# Patient Record
Sex: FEMALE | Race: BLACK OR AFRICAN AMERICAN | Employment: FULL TIME | URBAN - METROPOLITAN AREA
[De-identification: names, ages, dates, MRNs, and addresses within clinical notes are randomized per-mention and may not be internally consistent; named-entity substitution may affect disease eponyms.]

---

## 2017-04-10 ENCOUNTER — TRANSFERRED RECORDS (OUTPATIENT)
Dept: HEALTH INFORMATION MANAGEMENT | Facility: CLINIC | Age: 27
End: 2017-04-10

## 2019-07-31 ENCOUNTER — TRANSFERRED RECORDS (OUTPATIENT)
Dept: HEALTH INFORMATION MANAGEMENT | Facility: CLINIC | Age: 29
End: 2019-07-31

## 2019-10-19 ENCOUNTER — TRANSFERRED RECORDS (OUTPATIENT)
Dept: HEALTH INFORMATION MANAGEMENT | Facility: CLINIC | Age: 29
End: 2019-10-19

## 2020-02-09 ENCOUNTER — HOSPITAL ENCOUNTER (EMERGENCY)
Facility: CLINIC | Age: 30
Discharge: HOME | End: 2020-02-09
Attending: EMERGENCY MEDICINE | Admitting: EMERGENCY MEDICINE

## 2020-02-09 VITALS
HEART RATE: 59 BPM | RESPIRATION RATE: 20 BRPM | OXYGEN SATURATION: 100 % | TEMPERATURE: 98.1 F | SYSTOLIC BLOOD PRESSURE: 130 MMHG | DIASTOLIC BLOOD PRESSURE: 79 MMHG

## 2020-02-09 DIAGNOSIS — F41.0 PANIC ATTACK: ICD-10-CM

## 2020-02-09 PROCEDURE — 93010 ELECTROCARDIOGRAM REPORT: CPT | Mod: Z6 | Performed by: EMERGENCY MEDICINE

## 2020-02-09 PROCEDURE — 93005 ELECTROCARDIOGRAM TRACING: CPT | Performed by: EMERGENCY MEDICINE

## 2020-02-09 PROCEDURE — 99283 EMERGENCY DEPT VISIT LOW MDM: CPT | Performed by: EMERGENCY MEDICINE

## 2020-02-09 PROCEDURE — 99284 EMERGENCY DEPT VISIT MOD MDM: CPT | Mod: 25 | Performed by: EMERGENCY MEDICINE

## 2020-02-09 RX ORDER — LORAZEPAM 1 MG/1
1 TABLET ORAL EVERY 6 HOURS PRN
COMMUNITY
End: 2021-02-11

## 2020-02-09 RX ORDER — OMEPRAZOLE 40 MG/1
40 CAPSULE, DELAYED RELEASE ORAL DAILY
COMMUNITY
End: 2020-06-30

## 2020-02-09 RX ORDER — CITALOPRAM HYDROBROMIDE 20 MG/1
20 TABLET ORAL DAILY
COMMUNITY
End: 2020-06-30

## 2020-02-09 SDOH — HEALTH STABILITY: MENTAL HEALTH: HOW OFTEN DO YOU HAVE A DRINK CONTAINING ALCOHOL?: NEVER

## 2020-02-09 NOTE — ED AVS SNAPSHOT
[facility] Emergency Department  [address]   [place] [address]  Phone:  [phone]  Fax:  [phone]                                    Debo Do   MRN: [id number]    Department:  [facility] Emergency Department   Date of Visit:  2/9/2020           After Visit Summary Signature Page    I have received my discharge instructions, and my questions have been answered. I have discussed any challenges I see with this plan with the nurse or doctor.    ..........................................................................................................................................  Patient/Patient Representative Signature      ..........................................................................................................................................  Patient Representative Print Name and Relationship to Patient    ..................................................               ................................................  Date                                   Time    ..........................................................................................................................................  Reviewed by Signature/Title    ...................................................              ..............................................  Date                                               Time          22EPIC Rev 08/18

## 2020-02-10 NOTE — ED TRIAGE NOTES
Pt reports this evening she was watching TV and felt an area on her back that was hot, after a short period she started feeling hot all over and developed tingling in her right arm and hand. When she tried to go to sleep she developed tightness in her chest. Pt reports a history of panic attaches and states her symptoms this evening are similar to what she has during an attach. Pt states she has been trying to cut back on her lorazepam and hadn't taking for about a week, states she took one on the way here but has not had any relief.

## 2020-02-10 NOTE — ED PROVIDER NOTES
History     Chief Complaint   Patient presents with     Chest Pain     HPI  Debo Do is a 29 year old female who presents with possible panic attack.  Patient states she is had a long history of the same.  For the last few months she has been taking lorazepam in the evenings so she prevents panic attacks and is able to sleep.  Been trying to wean herself off that and abruptly stopped that last Sunday.  She has been treated for this with Celexa.  She was on 10 mg dose without much improvement.  Few months back the increased her dose to 20 mg a day and since that time she feels it is been more beneficial.  She denies any significant depression or suicidal ideation currently.  Does have a history of acid reflux and takes Prilosec for this.  Former smoker.  No history of thyroid issues.  She was watching TV and developed a burning sensation in her back and that moved to her chest.  She then developed some numbness and tingling in her extremities.  She felt tight in her chest so she tried to go to sleep to see if that would break the cycle.  She states this is very similar to her previous panic attacks.  Unfortunately she was unable to break the cycle so she took 1 mg of Ativan and presents here for evaluation.  By the time I speak with the patient she states the Ativan is finally kicking in and she feels almost back to baseline.  She denies any recent illness.  No cough or cold symptoms.  Currently has no chest tightness or pain.  Does not feel short of breath.  The numbness in her hand has resolved.  Patient recently moved from [place].  She has a primary care provider but is not establish care with a therapist yet.    Allergies:  No Known Allergies    Problem List:    There are no active problems to display for this patient.       Past Medical History:    History reviewed. No pertinent past medical history.    Past Surgical History:    History reviewed. No pertinent surgical history.    Family History:     History reviewed. No pertinent family history.    Social History:  Marital Status:    Social History     Tobacco Use     Smoking status: Former Smoker     Smokeless tobacco: Never Used     Tobacco comment: quit in Oct   Substance Use Topics     Alcohol use: Not Currently     Frequency: Never     Drug use: Not Currently        Medications:    citalopram (CELEXA) 20 MG tablet  LORazepam (ATIVAN) 1 MG tablet  omeprazole (PRILOSEC) 40 MG DR capsule          Review of Systems all other systems are reviewed and are negative.    Physical Exam   BP: (!) 129/93  Heart Rate: 77  Temp: 98.1  F (36.7  C)  Resp: 20  SpO2: 99 %      Physical Exam general pleasant cooperative female who does not look toxic or ill.  She is vitally stable.  She is not hypoxic.  HEENT shows no proptosis.  Her speech is clear.  No pressured speech or flight of ideas.  Neck reveals no thyromegaly.  Lungs are clear without adventitious sounds.  Cardiac auscultation is normal without murmur.  Neurologic exam is nonfocal.    ED Course        Procedures               EKG Interpretation:      Interpreted by Delmer Vickers MD  Time reviewed: 22:05   Symptoms at time of EKG: chest tightness  Rhythm: normal sinus   Rate: Bradycardia  Axis: Normal  Ectopy: none  Conduction: normal  ST Segments/ T Waves: No acute ischemic changes  Q Waves: none  Comparison to prior: No old EKG available    Clinical Impression: sinus bradycardia                Critical Care time:  none               No results found for this or any previous visit (from the past 24 hour(s)).    Medications - No data to display  Just prior to discharge at 10:15 PM patient feels back to baseline and is ready to go home.  Assessments & Plan (with Medical Decision Making)   Debo Do is a 29 year old female who presents with possible panic attack.  Patient states she is had a long history of the same.  For the last few months she has been taking lorazepam in the evenings so she prevents panic  attacks and is able to sleep.  Been trying to wean herself off that and abruptly stopped that last Sunday.  She has been treated for this with Celexa.  She was on 10 mg dose without much improvement.  Few months back the increased her dose to 20 mg a day and since that time she feels it is been more beneficial.  She denies any significant depression or suicidal ideation currently.  Does have a history of acid reflux and takes Prilosec for this.  Former smoker.  No history of thyroid issues.  She was watching TV and developed a burning sensation in her back and that moved to her chest.  She then developed some numbness and tingling in her extremities.  She felt tight in her chest so she tried to go to sleep to see if that would break the cycle.  She states this is very similar to her previous panic attacks.  Unfortunately she was unable to break the cycle so she took 1 mg of Ativan and presents here for evaluation.  By the time I speak with the patient she states the Ativan is finally kicking in and she feels almost back to baseline.  She denies any recent illness.  No cough or cold symptoms.  Currently has no chest tightness or pain.  Does not feel short of breath.  The numbness in her hand has resolved.  On exam patient was afebrile and vitally stable.  She is not hypoxic.  Her exam is unremarkable as noted above.  EKG showed a sinus bradycardia at 56 with no acute ischemic changes.  No old EKG for comparison as she is just moved recently from [place].  Patient took her own Ativan and had relief of her symptoms.  Suspect panic attack.  She has a primary doctor but does not have a therapist in the area so we have made a referral and they will contact her within the next 24 hours.  Information on panic attack is provided and reasons to return for reassessment are discussed..  I have reviewed the nursing notes.    I have reviewed the findings, diagnosis, plan and need for follow up with the patient.       New  Prescriptions    No medications on file       Final diagnoses:   Panic attack       2/9/2020   [facility] EMERGENCY DEPARTMENT     Delmer Vickers MD  02/09/20 9401

## 2020-02-12 ENCOUNTER — TRANSFERRED RECORDS (OUTPATIENT)
Dept: HEALTH INFORMATION MANAGEMENT | Facility: CLINIC | Age: 30
End: 2020-02-12

## 2020-03-13 ENCOUNTER — DOCUMENTATION ONLY (OUTPATIENT)
Dept: INTERNAL MEDICINE | Facility: CLINIC | Age: 30
End: 2020-03-13

## 2020-03-13 ENCOUNTER — OFFICE VISIT (OUTPATIENT)
Dept: INTERNAL MEDICINE | Facility: CLINIC | Age: 30
End: 2020-03-13

## 2020-03-13 VITALS
DIASTOLIC BLOOD PRESSURE: 70 MMHG | HEIGHT: 62 IN | SYSTOLIC BLOOD PRESSURE: 116 MMHG | TEMPERATURE: 98.5 F | WEIGHT: 191.8 LBS | BODY MASS INDEX: 35.3 KG/M2 | RESPIRATION RATE: 20 BRPM | HEART RATE: 89 BPM | OXYGEN SATURATION: 100 %

## 2020-03-13 DIAGNOSIS — K21.9 GASTROESOPHAGEAL REFLUX DISEASE, ESOPHAGITIS PRESENCE NOT SPECIFIED: ICD-10-CM

## 2020-03-13 DIAGNOSIS — F41.1 GAD (GENERALIZED ANXIETY DISORDER): Primary | ICD-10-CM

## 2020-03-13 PROCEDURE — 99203 OFFICE O/P NEW LOW 30 MIN: CPT | Performed by: INTERNAL MEDICINE

## 2020-03-13 ASSESSMENT — MIFFLIN-ST. JEOR: SCORE: 1553.25

## 2020-03-13 ASSESSMENT — ENCOUNTER SYMPTOMS
ABDOMINAL PAIN: 0
VOMITING: 0
AGITATION: 0
NAUSEA: 0
NERVOUS/ANXIOUS: 0
HEARTBURN: 0

## 2020-03-13 ASSESSMENT — ANXIETY QUESTIONNAIRES
6. BECOMING EASILY ANNOYED OR IRRITABLE: SEVERAL DAYS
GAD7 TOTAL SCORE: 2
4. TROUBLE RELAXING: NOT AT ALL
2. NOT BEING ABLE TO STOP OR CONTROL WORRYING: NOT AT ALL
GAD7 TOTAL SCORE: 2
GAD7 TOTAL SCORE: 2
5. BEING SO RESTLESS THAT IT IS HARD TO SIT STILL: NOT AT ALL
3. WORRYING TOO MUCH ABOUT DIFFERENT THINGS: SEVERAL DAYS
1. FEELING NERVOUS, ANXIOUS, OR ON EDGE: NOT AT ALL
7. FEELING AFRAID AS IF SOMETHING AWFUL MIGHT HAPPEN: NOT AT ALL
7. FEELING AFRAID AS IF SOMETHING AWFUL MIGHT HAPPEN: NOT AT ALL

## 2020-03-13 ASSESSMENT — PATIENT HEALTH QUESTIONNAIRE - PHQ9
SUM OF ALL RESPONSES TO PHQ QUESTIONS 1-9: 1
10. IF YOU CHECKED OFF ANY PROBLEMS, HOW DIFFICULT HAVE THESE PROBLEMS MADE IT FOR YOU TO DO YOUR WORK, TAKE CARE OF THINGS AT HOME, OR GET ALONG WITH OTHER PEOPLE: NOT DIFFICULT AT ALL
SUM OF ALL RESPONSES TO PHQ QUESTIONS 1-9: 1

## 2020-03-13 NOTE — NURSING NOTE
"/70 (BP Location: Right arm, Patient Position: Sitting, Cuff Size: Adult Large)   Pulse 89   Temp 98.5  F (36.9  C) (Oral)   Resp 20   Ht 1.575 m (5' 2\")   Wt 87 kg (191 lb 12.8 oz)   LMP 03/09/2020 (Exact Date)   SpO2 100%   Breastfeeding No   BMI 35.08 kg/m    Sol Penn CMA    "

## 2020-03-13 NOTE — PROGRESS NOTES
Subjective     Debo Do is a 30 year old female who presents to clinic today for the following health issues:    HPI   Patient has general anxiety disorder and use citalopram and lorazepam.  Patient moved from [place] after finishing her school.  Patient use lorazepam once or 2 pills a month.  Patient has enough refills of her medication and started taking citalopram regularly.    Patient also has gastroesophageal reflux disease and takes omeprazole.  Had endoscopy in the past.  Denies reflux symptoms.      Patient Active Problem List   Diagnosis     Gastroesophageal reflux disease, esophagitis presence not specified     DIGNA (generalized anxiety disorder)     History reviewed. No pertinent surgical history.    Social History     Tobacco Use     Smoking status: Former Smoker     Smokeless tobacco: Never Used     Tobacco comment: quit in Oct   Substance Use Topics     Alcohol use: Not Currently     Frequency: Never     Family History   Problem Relation Age of Onset     Hypertension Mother      Hyperlipidemia Father      No Known Problems Maternal Grandmother      No Known Problems Maternal Grandfather      Dementia Paternal Grandmother         Possibly alzheimers     No Known Problems Paternal Grandfather          Current Outpatient Medications   Medication Sig Dispense Refill     citalopram (CELEXA) 20 MG tablet Take 20 mg by mouth daily       LORazepam (ATIVAN) 1 MG tablet Take 1 mg by mouth every 6 hours as needed for anxiety       omeprazole (PRILOSEC) 40 MG DR capsule Take 40 mg by mouth daily       vitamin C-electrolytes (EMERGEN-C) 1000mg vitamin C super orange drink mix Take 1 packet by mouth daily Mix 1 packet in 4-6oz water and take once or twice daily or as directed.       No Known Allergies      Reviewed and updated as needed this visit by Provider  Meds  Problems       Review of Systems   Gastrointestinal: Negative for abdominal pain, heartburn, nausea and vomiting.   Psychiatric/Behavioral:  "Negative for agitation. The patient is not nervous/anxious.           Objective    /70 (BP Location: Right arm, Patient Position: Sitting, Cuff Size: Adult Large)   Pulse 89   Temp 98.5  F (36.9  C) (Oral)   Resp 20   Ht 1.575 m (5' 2\")   Wt 87 kg (191 lb 12.8 oz)   LMP 03/09/2020 (Exact Date)   SpO2 100%   Breastfeeding No   BMI 35.08 kg/m    Body mass index is 35.08 kg/m .  Physical Exam  Vitals signs reviewed.   Constitutional:       Appearance: Normal appearance.   Neck:      Musculoskeletal: Neck supple.   Cardiovascular:      Rate and Rhythm: Normal rate and regular rhythm.      Pulses: Normal pulses.   Pulmonary:      Effort: Pulmonary effort is normal.      Breath sounds: Normal breath sounds. No wheezing.   Abdominal:      Palpations: Abdomen is soft. There is no mass.      Tenderness: There is no abdominal tenderness.   Lymphadenopathy:      Cervical: No cervical adenopathy.   Neurological:      General: No focal deficit present.      Mental Status: She is alert.   Psychiatric:         Mood and Affect: Mood normal.        Assessment & Plan     Debo was seen today for establish care.    Diagnoses and all orders for this visit:    DIGNA (generalized anxiety disorder)    Gastroesophageal reflux disease, esophagitis presence not specified      Advised the patient to follow-up when she get insurance through physical.    Answers for HPI/ROS submitted by the patient on 3/13/2020   If you checked off any problems, how difficult have these problems made it for you to do your work, take care of things at home, or get along with other people?: Not difficult at all  PHQ9 TOTAL SCORE: 1  DIGNA 7 TOTAL SCORE: 2    Lionel Fan MD  [facility]  "

## 2020-03-14 ASSESSMENT — ANXIETY QUESTIONNAIRES: GAD7 TOTAL SCORE: 2

## 2020-04-07 ENCOUNTER — TELEPHONE (OUTPATIENT)
Dept: INTERNAL MEDICINE | Facility: CLINIC | Age: 30
End: 2020-04-07

## 2020-04-07 NOTE — TELEPHONE ENCOUNTER
Patient called with questions regarding the 3 medications she takes. Declined needing refills, declined any further information. Call [phone].

## 2020-04-08 NOTE — TELEPHONE ENCOUNTER
Pt calls back. She wanted to know if Dr Fan would be able to refill her medications when the time came for them to refill.    Advised her that according to the OV notes, that the doctor should be able to refill the medications when due.     She states she still doesn't have insurance yet.     Advised to call when she has a few days left of her medication and we can discuss with Dr Fan about refilling. She agrees.

## 2020-05-12 ENCOUNTER — VIRTUAL VISIT (OUTPATIENT)
Dept: INTERNAL MEDICINE | Facility: CLINIC | Age: 30
End: 2020-05-12
Payer: COMMERCIAL

## 2020-05-12 DIAGNOSIS — F41.1 GAD (GENERALIZED ANXIETY DISORDER): ICD-10-CM

## 2020-05-12 DIAGNOSIS — Z13.220 ENCOUNTER FOR LIPID SCREENING FOR CARDIOVASCULAR DISEASE: ICD-10-CM

## 2020-05-12 DIAGNOSIS — Z31.9 DESIRE FOR PREGNANCY: Primary | ICD-10-CM

## 2020-05-12 DIAGNOSIS — Z13.6 ENCOUNTER FOR LIPID SCREENING FOR CARDIOVASCULAR DISEASE: ICD-10-CM

## 2020-05-12 PROCEDURE — 99214 OFFICE O/P EST MOD 30 MIN: CPT | Mod: 95 | Performed by: INTERNAL MEDICINE

## 2020-05-12 ASSESSMENT — ENCOUNTER SYMPTOMS
NERVOUS/ANXIOUS: 0
AGITATION: 0
DYSPHORIC MOOD: 0

## 2020-05-12 NOTE — PROGRESS NOTES
"Debo Do is a 30 year old female who is being evaluated via a billable video visit.      The patient has been notified of following:     \"This video visit will be conducted via a call between you and your physician/provider. We have found that certain health care needs can be provided without the need for an in-person physical exam.  This service lets us provide the care you need with a video conversation.  If a prescription is necessary we can send it directly to your pharmacy.  If lab work is needed we can place an order for that and you can then stop by our lab to have the test done at a later time.    Video visits are billed at different rates depending on your insurance coverage.  Please reach out to your insurance provider with any questions.    If during the course of the call the physician/provider feels a video visit is not appropriate, you will not be charged for this service.\"    Patient has given verbal consent for Video visit? Yes    How would you like to obtain your AVS? [place]    Patient would like the video invitation sent by: Text to cell phone: [phone]    Will anyone else be joining your video visit? No    Rama Lozano CMA.      Subjective     Debo Do is a 30 year old female who presents today via video visit for the following health issues:    [facility]     Video Start Time: 1:49 PM    Patient has anxiety and takes citalopram and lorazepam as needed.  Patient had one episode of panic attack which was not controlled by lorazepam and patient had to go to ER.  So patient is worried and takes lorazepam every day at bedtime.  Patient is also thinking about becoming pregnant and wanted to make sure if it is safe to continue her anxiety medications.  Patient does not smoke cigarettes or tobacco.  Patient smokes marijuana intermittently.  Does not drink alcohol regularly.  Currently not on birth control.  Patient is also having difficulty losing weight and has been exercising regularly " "with her boyfriend.  Patient wanted not the medications making her gain weight.    Patient Active Problem List   Diagnosis     Gastroesophageal reflux disease, esophagitis presence not specified     DIGNA (generalized anxiety disorder)     No past surgical history on file.    Social History     Tobacco Use     Smoking status: Former Smoker     Smokeless tobacco: Never Used     Tobacco comment: quit in Oct   Substance Use Topics     Alcohol use: Not Currently     Frequency: Never     Family History   Problem Relation Age of Onset     Hypertension Mother      Hyperlipidemia Father      No Known Problems Maternal Grandmother      No Known Problems Maternal Grandfather      Dementia Paternal Grandmother         Possibly alzheimers     No Known Problems Paternal Grandfather          Current Outpatient Medications   Medication Sig Dispense Refill     citalopram (CELEXA) 20 MG tablet Take 20 mg by mouth daily       LORazepam (ATIVAN) 1 MG tablet Take 1 mg by mouth every 6 hours as needed for anxiety       omeprazole (PRILOSEC) 40 MG DR capsule Take 40 mg by mouth daily       vitamin C-electrolytes (EMERGEN-C) 1000mg vitamin C super orange drink mix Take 1 packet by mouth daily Mix 1 packet in 4-6oz water and take once or twice daily or as directed.       No Known Allergies    Reviewed and updated as needed this visit by Provider      Review of Systems   Psychiatric/Behavioral: Negative for agitation, dysphoric mood and suicidal ideas. The patient is not nervous/anxious.             Objective    There were no vitals taken for this visit.  Estimated body mass index is 35.08 kg/m  as calculated from the following:    Height as of 3/13/20: 1.575 m (5' 2\").    Weight as of 3/13/20: 87 kg (191 lb 12.8 oz).  Physical Exam     \"GENERAL: Healthy, alert and no distress\",\"EYES: Eyes grossly normal to inspection.  No discharge or erythema, or obvious scleral/conjunctival abnormalities.\",\"RESP: No audible wheeze, cough, or visible " "cyanosis.  No visible retractions or increased work of breathing.  \",\"SKIN: Visible skin clear. No significant rash, abnormal pigmentation or lesions.\",\"NEURO: Cranial nerves grossly intact.  Mentation and speech appropriate for age.\",\"PSYCH: Mentation appears normal, affect normal/bright, judgement and insight intact, normal speech and appearance well-groomed.\"    Assessment & Plan     Debo was seen today for recheck medication.    Diagnoses and all orders for this visit:    Desire for pregnancy  -     **CBC with platelets FUTURE anytime; Future  -     **Comprehensive metabolic panel FUTURE anytime; Future  -     **TSH with free T4 reflex FUTURE anytime; Future    DIGNA (generalized anxiety disorder)  -     **CBC with platelets FUTURE anytime; Future  -     **Comprehensive metabolic panel FUTURE anytime; Future  -     **TSH with free T4 reflex FUTURE anytime; Future    Encounter for lipid screening for cardiovascular disease  -     Lipid panel reflex to direct LDL Fasting; Future    Advised the patient that studies are shown citalopram is safe in pregnancy.  Recommended against using lorazepam if she does not have panic attack.  Coping mechanisms were discussed.  We will do blood work as she is planning to become pregnant and also check thyroid as she has difficulty losing weight.    Also advised the patient start taking prenatal vitamins.  Encouraged the patient to avoid alcohol use and cut back and quit marijuana use.  Also advised the patient to call National Pregnancy Registry for Antidepressants (NPRAD) when she becomes pregnant.    Lionel Fan MD  [facility]      Video-Visit Details    Type of service:  Video Visit    Video End Time:2:02 PM    Originating Location (pt. Location): Home    Distant Location (provider location):  [facility]     Platform used for Video Visit: Bhavya Fan MD        "

## 2020-05-12 NOTE — PATIENT INSTRUCTIONS
Patient Education     Understanding Anxiety Disorders  Almost everyone gets nervous now and then. It s normal to have knots in your stomach before a test, or for your heart to race on a first date. But an anxiety disorder is much more than a case of nerves. In fact, its symptoms may be overwhelming. But treatment can relieve many of these symptoms. Talking to your healthcare provider is the first step.    What are anxiety disorders?  An anxiety disorder causes intense feelings of panic and fear. These feelings may arise for no apparent reason. And they tend to recur again and again. They may prevent you from coping with life and cause you great distress. As a result, you may avoid anything that triggers your fear. In extreme cases, you may never leave the house. Anxiety disorders may cause other symptoms, such as:    Obsessive thoughts that are unwanted and you can t control    Constant nightmares or painful thoughts of the past    Nausea, sweating, and muscle tension    Trouble sleeping or concentrating  What causes anxiety disorders?  Anxiety disorders tend to run in families. For some people, childhood abuse or neglect may play a role. For others, stressful life events or trauma may trigger anxiety disorders. Anxiety can trigger low self-esteem and poor coping skills.    Panic disorder. This causes an intense fear of being in danger.    Phobias. These are extreme fears of certain objects, places, or events.    Obsessive-compulsive disorder. This causes you to have unwanted thoughts and urges. You also may perform certain actions over and over.    Posttraumatic stress disorder. This occurs in people who have survived a terrible ordeal. It can cause nightmares and flashbacks about the event.    Generalized anxiety disorder. This causes constant worry that can greatly disrupt your life.    Getting better  You may believe that nothing can help you. Or, you might fear what others may think. But most anxiety symptoms  can be eased. Having an anxiety disorder is nothing to be ashamed of. Most people do best with treatment that combines medicine and individual and group therapy. These aren t cures. But they can help you live a healthier life.  Date Last Reviewed: 2/1/2017 2000-2019 [employer]. [address] [place] [address], [place] [address]. All rights reserved. This information is not intended as a substitute for professional medical care. Always follow your healthcare professional's instructions.

## 2020-05-15 DIAGNOSIS — F41.1 GAD (GENERALIZED ANXIETY DISORDER): ICD-10-CM

## 2020-05-15 DIAGNOSIS — Z31.9 DESIRE FOR PREGNANCY: ICD-10-CM

## 2020-05-15 DIAGNOSIS — Z13.6 ENCOUNTER FOR LIPID SCREENING FOR CARDIOVASCULAR DISEASE: ICD-10-CM

## 2020-05-15 DIAGNOSIS — Z13.220 ENCOUNTER FOR LIPID SCREENING FOR CARDIOVASCULAR DISEASE: ICD-10-CM

## 2020-05-15 LAB
ERYTHROCYTE [DISTWIDTH] IN BLOOD BY AUTOMATED COUNT: 14.3 % (ref 10–15)
HCT VFR BLD AUTO: 40 % (ref 35–47)
HGB BLD-MCNC: 12.6 G/DL (ref 11.7–15.7)
MCH RBC QN AUTO: 26.5 PG (ref 26.5–33)
MCHC RBC AUTO-ENTMCNC: 31.5 G/DL (ref 31.5–36.5)
MCV RBC AUTO: 84 FL (ref 78–100)
PLATELET # BLD AUTO: 232 10E9/L (ref 150–450)
RBC # BLD AUTO: 4.75 10E12/L (ref 3.8–5.2)
WBC # BLD AUTO: 6.3 10E9/L (ref 4–11)

## 2020-05-15 PROCEDURE — 36415 COLL VENOUS BLD VENIPUNCTURE: CPT | Performed by: INTERNAL MEDICINE

## 2020-05-15 PROCEDURE — 80053 COMPREHEN METABOLIC PANEL: CPT | Performed by: INTERNAL MEDICINE

## 2020-05-15 PROCEDURE — 80061 LIPID PANEL: CPT | Performed by: INTERNAL MEDICINE

## 2020-05-15 PROCEDURE — 85027 COMPLETE CBC AUTOMATED: CPT | Performed by: INTERNAL MEDICINE

## 2020-05-15 PROCEDURE — 84443 ASSAY THYROID STIM HORMONE: CPT | Performed by: INTERNAL MEDICINE

## 2020-05-16 LAB
ALBUMIN SERPL-MCNC: 3.5 G/DL (ref 3.4–5)
ALP SERPL-CCNC: 58 U/L (ref 40–150)
ALT SERPL W P-5'-P-CCNC: 24 U/L (ref 0–50)
ANION GAP SERPL CALCULATED.3IONS-SCNC: 5 MMOL/L (ref 3–14)
AST SERPL W P-5'-P-CCNC: 22 U/L (ref 0–45)
BILIRUB SERPL-MCNC: 0.4 MG/DL (ref 0.2–1.3)
BUN SERPL-MCNC: 15 MG/DL (ref 7–30)
CALCIUM SERPL-MCNC: 8.6 MG/DL (ref 8.5–10.1)
CHLORIDE SERPL-SCNC: 105 MMOL/L (ref 94–109)
CHOLEST SERPL-MCNC: 202 MG/DL
CO2 SERPL-SCNC: 27 MMOL/L (ref 20–32)
CREAT SERPL-MCNC: 0.94 MG/DL (ref 0.52–1.04)
GFR SERPL CREATININE-BSD FRML MDRD: 83 ML/MIN/{1.73_M2}
GLUCOSE SERPL-MCNC: 88 MG/DL (ref 70–99)
HDLC SERPL-MCNC: 51 MG/DL
LDLC SERPL CALC-MCNC: 138 MG/DL
NONHDLC SERPL-MCNC: 151 MG/DL
POTASSIUM SERPL-SCNC: 4.1 MMOL/L (ref 3.4–5.3)
PROT SERPL-MCNC: 7.8 G/DL (ref 6.8–8.8)
SODIUM SERPL-SCNC: 137 MMOL/L (ref 133–144)
TRIGL SERPL-MCNC: 67 MG/DL
TSH SERPL DL<=0.005 MIU/L-ACNC: 1.57 MU/L (ref 0.4–4)

## 2020-06-30 ENCOUNTER — TELEPHONE (OUTPATIENT)
Dept: INTERNAL MEDICINE | Facility: CLINIC | Age: 30
End: 2020-06-30

## 2020-06-30 DIAGNOSIS — K21.9 GASTROESOPHAGEAL REFLUX DISEASE, ESOPHAGITIS PRESENCE NOT SPECIFIED: ICD-10-CM

## 2020-06-30 DIAGNOSIS — F41.1 GAD (GENERALIZED ANXIETY DISORDER): Primary | ICD-10-CM

## 2020-06-30 RX ORDER — OMEPRAZOLE 40 MG/1
40 CAPSULE, DELAYED RELEASE ORAL DAILY
Qty: 90 CAPSULE | Refills: 1 | Status: SHIPPED | OUTPATIENT
Start: 2020-06-30 | End: 2021-01-05

## 2020-06-30 RX ORDER — CITALOPRAM HYDROBROMIDE 20 MG/1
20 TABLET ORAL DAILY
Qty: 90 TABLET | Refills: 1 | Status: SHIPPED | OUTPATIENT
Start: 2020-06-30 | End: 2020-07-01

## 2020-06-30 RX ORDER — ESCITALOPRAM OXALATE 20 MG/1
20 TABLET ORAL DAILY
Qty: 90 TABLET | Refills: 1 | Status: CANCELLED | OUTPATIENT
Start: 2020-06-30

## 2020-06-30 NOTE — TELEPHONE ENCOUNTER
Pt called back and reports that it is Escitalopram that she takes. Pt can be reached at [phone]. Please advise. Thanks.

## 2020-06-30 NOTE — TELEPHONE ENCOUNTER
Call to pt. She is not sure which one she is taking now.  She doesn't think she takes both Citalopram and Escitalopram.      She thinks it was the Escitalopram (Lexapro). She is going to check at home and call back.     If it is the Escitalopram, she wants to make sure it is safe to take while trying to get pregnant.     She will call back.

## 2020-06-30 NOTE — TELEPHONE ENCOUNTER
Lily Mcarthur calls stating patient has been on Lexapro 20MG 1x daily.  Is medication supposed to be changed to Citalopram or refilled as Lexapro.  Please advise.

## 2020-06-30 NOTE — TELEPHONE ENCOUNTER
Should we cancel the Citalopram at the pharmacy?    Pt states she would rather stay on her current medication. IF safe with pregnancy.

## 2020-07-01 RX ORDER — ESCITALOPRAM OXALATE 20 MG/1
20 TABLET ORAL DAILY
Qty: 90 TABLET | Refills: 1 | Status: SHIPPED | OUTPATIENT
Start: 2020-07-01 | End: 2021-01-05

## 2020-07-01 NOTE — TELEPHONE ENCOUNTER
Call to pt and advised. She would like more information. She doesn't want to change from Lexapro. She states this is working well.       If due to pregnancy,   Per [employer]:  Escitalopram: Fetal risk cannot be ruled out.  Citalopram: Either studies in animals have revealed adverse effects on the fetus (teratogenic or embryocidal or other) and there are no controlled studies in women or studies in women and animals are not available. Drugs should be given only if the potential benefit justifies the potential risk to the fetus.

## 2020-07-01 NOTE — TELEPHONE ENCOUNTER
I would prefer citalopram. Advise to stop lexapro and take citalopram. Call if DIGNA is not under good control.    Lionel Fan MD

## 2020-07-01 NOTE — TELEPHONE ENCOUNTER
Talked to the patient. She will continue lexapro 20mg. She never took citalopram. lexapro refilled.    Lionel Fan MD

## 2020-07-21 ENCOUNTER — MYC MEDICAL ADVICE (OUTPATIENT)
Dept: INTERNAL MEDICINE | Facility: CLINIC | Age: 30
End: 2020-07-21

## 2020-07-21 DIAGNOSIS — Z01.83 BLOOD TYPING ENCOUNTER: Primary | ICD-10-CM

## 2020-08-03 ENCOUNTER — PRENATAL OFFICE VISIT (OUTPATIENT)
Dept: FAMILY MEDICINE | Facility: CLINIC | Age: 30
End: 2020-08-03
Payer: COMMERCIAL

## 2020-08-03 DIAGNOSIS — Z34.90 SUPERVISION OF NORMAL PREGNANCY: Primary | ICD-10-CM

## 2020-08-03 PROCEDURE — 99207 ZZC NO CHARGE NURSE ONLY: CPT

## 2020-08-03 RX ORDER — PRENATAL VIT/IRON FUM/FOLIC AC 27MG-0.8MG
1 TABLET ORAL DAILY
COMMUNITY
End: 2021-02-11

## 2020-08-06 ENCOUNTER — MYC MEDICAL ADVICE (OUTPATIENT)
Dept: INTERNAL MEDICINE | Facility: CLINIC | Age: 30
End: 2020-08-06

## 2020-08-09 ENCOUNTER — E-VISIT (OUTPATIENT)
Dept: OBGYN | Facility: CLINIC | Age: 30
End: 2020-08-09
Payer: COMMERCIAL

## 2020-08-09 DIAGNOSIS — Z53.9 ERRONEOUS ENCOUNTER--DISREGARD: Primary | ICD-10-CM

## 2020-08-10 ENCOUNTER — PRENATAL OFFICE VISIT (OUTPATIENT)
Dept: OBGYN | Facility: CLINIC | Age: 30
End: 2020-08-10
Payer: COMMERCIAL

## 2020-08-10 VITALS
TEMPERATURE: 97.3 F | DIASTOLIC BLOOD PRESSURE: 78 MMHG | SYSTOLIC BLOOD PRESSURE: 118 MMHG | WEIGHT: 222.6 LBS | HEART RATE: 94 BPM | BODY MASS INDEX: 40.71 KG/M2

## 2020-08-10 DIAGNOSIS — Z12.4 SCREENING FOR CERVICAL CANCER: ICD-10-CM

## 2020-08-10 DIAGNOSIS — Z34.01 ENCOUNTER FOR SUPERVISION OF NORMAL FIRST PREGNANCY IN FIRST TRIMESTER: Primary | ICD-10-CM

## 2020-08-10 DIAGNOSIS — Z11.3 SCREEN FOR STD (SEXUALLY TRANSMITTED DISEASE): ICD-10-CM

## 2020-08-10 PROCEDURE — 99203 OFFICE O/P NEW LOW 30 MIN: CPT | Performed by: OBSTETRICS & GYNECOLOGY

## 2020-08-10 PROCEDURE — 87591 N.GONORRHOEAE DNA AMP PROB: CPT | Performed by: OBSTETRICS & GYNECOLOGY

## 2020-08-10 PROCEDURE — 87491 CHLMYD TRACH DNA AMP PROBE: CPT | Performed by: OBSTETRICS & GYNECOLOGY

## 2020-08-10 PROCEDURE — G0145 SCR C/V CYTO,THINLAYER,RESCR: HCPCS | Performed by: OBSTETRICS & GYNECOLOGY

## 2020-08-10 NOTE — PROGRESS NOTES
"New OB Visit    Debo Do is a 30 year old  at 5w5d by LMP who presents today for a new OB exam. This is a planned and a desired pregnancy. Since her LMP, has experienced  pelvic pain and vaginal bleeding. Minor cramping and light spotting a few days ago, now resolved. The patient denies abdominal pain, or vaginal discharge. She denies nausea, emesis, fatigue, urinary urgency, urinary frequency, vaginal bleeding, hemorrhoids and constipation.    Pregnancy complicated by:  -Anxiety: Stopped Lorazepam a few months ago, still on Lexapro. Doing well.     Have you travelled during the pregnancy? [place] early pregnancy  Have your sexual partner(s) travelled during the pregnancy? [place]      Ob Hx:     Past Medical History of Father of Baby: No significant medical history. Needed a hernia repair as an infant.    Gyn Hx: Patient's last menstrual period was 2020.     Last pap was unknown, possibly done in [place]   STI history denies      Family history genetic disorders, cystic fibrosis, SMA, intellectual disability or autism- denies      There is no immunization history on file for this patient.     PMH:   Past Medical History:   Diagnosis Date     DIGNA (generalized anxiety disorder) 3/13/2020     Gastroesophageal reflux disease, esophagitis presence not specified 3/13/2020       SurgHx:   Past Surgical History:   Procedure Laterality Date     ENDOSCOPY       HC TOOTH EXTRACTION W/FORCEP         FamHx:   Family History   Problem Relation Age of Onset     Hypertension Mother      Hyperlipidemia Father      No Known Problems Maternal Grandmother      No Known Problems Maternal Grandfather      Dementia Paternal Grandmother         Possibly alzheimers     No Known Problems Paternal Grandfather      No Known Problems Sister      Diabetes Half-Sister         \"pre\"     No Known Problems Half-Sister      No Known Problems Half-Brother        SocHx:   Social History     Socioeconomic History     Marital " status:      Spouse name: Not on file     Number of children: Not on file     Years of education: Not on file     Highest education level: Not on file   Occupational History     Not on file   Social Needs     Financial resource strain: Not on file     Food insecurity     Worry: Not on file     Inability: Not on file     Transportation needs     Medical: Not on file     Non-medical: Not on file   Tobacco Use     Smoking status: Former Smoker     Smokeless tobacco: Never Used     Tobacco comment: quit in Oct   Substance and Sexual Activity     Alcohol use: Not Currently     Frequency: Never     Drug use: Not Currently     Sexual activity: Yes     Partners: Male   Lifestyle     Physical activity     Days per week: Not on file     Minutes per session: Not on file     Stress: Not on file   Relationships     Social connections     Talks on phone: Not on file     Gets together: Not on file     Attends [employer] service: Not on file     Active member of club or organization: Not on file     Attends meetings of clubs or organizations: Not on file     Relationship status: Not on file     Intimate partner violence     Fear of current or ex partner: Not on file     Emotionally abused: Not on file     Physically abused: Not on file     Forced sexual activity: Not on file   Other Topics Concern     Not on file   Social History Narrative    8/2020  Lives in [place] with , Lance.  No indoor cats/kittens.  Debo stopped smoking (cigars) 11/2019.  Reports she started again was smoking about 1 month before she knew she was pregnant.  No concerns about domestic violence.  Debo is a  and  in a gym.         Allergies:   Patient has no known allergies.    Medications:   escitalopram (LEXAPRO) 20 MG tablet, Take 1 tablet (20 mg) by mouth daily  omeprazole (PRILOSEC) 40 MG DR capsule, Take 1 capsule (40 mg) by mouth daily  Prenatal Vit-Fe Fumarate-FA (PRENATAL MULTIVITAMIN W/IRON) 27-0.8 MG  tablet, Take 1 tablet by mouth daily  LORazepam (ATIVAN) 1 MG tablet, Take 1 mg by mouth every 6 hours as needed for anxiety  vitamin C-electrolytes (EMERGEN-C) 1000mg vitamin C super orange drink mix, Take 1 packet by mouth daily Mix 1 packet in 4-6oz water and take once or twice daily or as directed.    No current facility-administered medications on file prior to visit.       Past medical, surgical, social and family history were reviewed and updated in Epic.      ROS: As described in HPI, otherwise negative for fever/chills, fatigue, dizziness, changes or new deficits in vision, worrisome rashes, new lumps or masses, cough/SOB/CP, GI distress, dysuria, abnormal vaginal discharge, constipation/diarrhea, neurological deficits, or other systemic complaints    EXAM:  Vitals: /78   Pulse 94   Temp 97.3  F (36.3  C) (Tympanic)   Wt 101 kg (222 lb 9.6 oz)   LMP 07/01/2020   BMI 40.71 kg/m    BMI= Body mass index is 40.71 kg/m .      Gen: Alert, oriented, appropriately interactive, NAD  Neck: soft, no cervical adenopathy, no masses  CV: RRR, no murmurs, no extra heart sounds, 2+ peripheral pulses  Resp: CTAB, good effort without distress   Breasts: no axillary adenopathy, no dominant masses, no skin changes, no nipple discharge, nontender  Abdomen: soft, gravid, non tender, non distended, no masses, no hernias. No inguinal lymphadenopathy  External genitalia: no lesions; normal appearing external genitalia, bartholins glands, urethra, skenes glands  Vagina: no masses or lesions or discharge, normally rugated.  Cervix: no masses or lesions or discharge   Bimanual exam:   Urethra nontender   Bladder: nontender and without massess, well supported   Uterus: midline , anteverted, mobile , no masses, non-tender, gravid at 6 weeks  Adnexa: no masses or tenderness   No cervical motion tenderness  Lower extremities: non-tender, no edema  Skin: no lesions or rashes    Pap obtained Yes    Labs & Imaging:    CBC RESULTS:    Recent Labs   Lab Test 05/15/20  1438   WBC 6.3   RBC 4.75   HGB 12.6   HCT 40.0   MCV 84   MCH 26.5   MCHC 31.5   RDW 14.3          No results found for this or any previous visit.      ASSESSMENT/PLAN: Debo Do is a 30 year old  at 5w5d by LMP who presents today for her first ob visit      ICD-10-CM    1. Encounter for supervision of normal first pregnancy in first trimester  Z34.01        Pregnancy c/b:  -Anxiety, taking Lexapro and doing well. No SI/HI. No longer taking ativan.    Routine Prenatal Care:  -Discussed genetic screening options, including sequential and quad testing/AFP, cell-free DNA as well as diagnostic testing including amniocentesis. Patient would like to think about further, decide by next visit  -Discussed Cystic Fibrosis and SMA carrier screening, the patient would like to think about further, decide by next visit  -Discussed ordered labs and ultrasound,  all questions answered.  Early GCT not needed  -Discussed benefits of breastfeeding  -Folder given, outlining p hysician coverage, exercise, weight gain, schedule of visits, routine and indicated ultrasounds, prenatal vitamins and childbirth education.  Desires delivery at [facility]  -Body mass index is 40.71 kg/m . - recommend 11-20 lbs      Return in 4 weeks for routine OB care        Inga Salazar DO  8/10/2020 11:55 AM

## 2020-08-11 ENCOUNTER — MYC MEDICAL ADVICE (OUTPATIENT)
Dept: OBGYN | Facility: CLINIC | Age: 30
End: 2020-08-11

## 2020-08-11 LAB
C TRACH DNA SPEC QL NAA+PROBE: NEGATIVE
N GONORRHOEA DNA SPEC QL NAA+PROBE: NEGATIVE
SPECIMEN SOURCE: NORMAL
SPECIMEN SOURCE: NORMAL

## 2020-08-12 LAB
COPATH REPORT: NORMAL
PAP: NORMAL

## 2020-08-21 ENCOUNTER — NURSE TRIAGE (OUTPATIENT)
Dept: NURSING | Facility: CLINIC | Age: 30
End: 2020-08-21

## 2020-08-22 ENCOUNTER — NURSE TRIAGE (OUTPATIENT)
Dept: NURSING | Facility: CLINIC | Age: 30
End: 2020-08-22

## 2020-08-22 NOTE — TELEPHONE ENCOUNTER
" - 7w 2d - is having period-like cramps. Says the cramping is constant and 3/10 - cramping started 3 hours ago. Has been spotting since yesterday - yesterday was light pink - today is a darker pink. Notices it after she wipes. Also complains of being constipated. Says when she peed the other day it looked like a string was in her urine. T 98.3 - oral.    Per protocol advised evaluation in 24 hours - will go to ED tomorrow if symptoms continue.    Dana Wilhelm RN on 2020 at 10:18 PM    Additional Information    Negative: Passed out (i.e., lost consciousness, collapsed and was not responding)    Negative: Shock suspected (e.g., cold/pale/clammy skin, too weak to stand, low BP, rapid pulse)    Negative: Difficult to awaken or acting confused (e.g., disoriented, slurred speech)    Negative: Sounds like a life-threatening emergency to the triager    Negative: Followed an abdomen (stomach) injury    Negative: [1] Abdominal pain AND [2] pregnant > 20 weeks    Negative: MODERATE-SEVERE abdominal pain (e.g., interferes with normal activities, awakens from sleep)    Negative: [1] SEVERE vaginal bleeding (i.e., soaking 2 pads / hour, large blood clots) AND [2] present 2 or more hours    Negative: [1] MODERATE vaginal bleeding (i.e., soaking 1 pad / hour; clots) AND [2] present > 6 hours    Negative: [1] MODERATE vaginal bleeding (i.e., soaking 1 pad / hour; clots) AND [2] pregnant > 12 weeks    Negative: Passed tissue (e.g., gray-white)    Negative: [1] Vomiting AND [2] contains red blood or black (\"coffee ground\") material  (Exception: few red streaks in vomit that only happened once)    Negative: Shoulder pain    Negative: Lightheadedness or dizziness (e.g., feels like passing out)    Negative: Patient sounds very sick or weak to the triager    Negative: [1] Constant abdominal pain AND [2] present > 2 hours    Negative: Blood in urine (red, pink, or tea-colored)    Negative: Fever > 100.4 F (38.0 C)    Negative: " White of the eyes have turned yellow (i.e., jaundice)    Negative: [1] Intermittent lower abdominal pain (e.g., cramping) AND [2] present > 24 hours    MILD vaginal bleeding (e.g., < 1 pad / hour) or SPOTTING    Protocols used: PREGNANCY - ABDOMINAL PAIN LESS THAN 20 WEEKS [facility]-A-AH

## 2020-08-22 NOTE — TELEPHONE ENCOUNTER
She is having cramping and some dark spotting.  She passed a dime sized clot yesterday, she is spotting when she wipes .  She has been feeling continuous cramping since yesterday since she worked out .  I spoke with L& D in [place], and they recommended if she soaks 1/2 a panty liner or more to go to the ED to be evaluated. Call back to patient to let her know the information.    Angelia Lambert RN/ [facility]        Additional Information    Negative: Shock suspected (e.g., cold/pale/clammy skin, too weak to stand, low BP, rapid pulse)    Negative: Difficult to awaken or acting confused (e.g., disoriented, slurred speech)    Negative: Passed out (i.e., lost consciousness, collapsed and was not responding)    Negative: Sounds like a life-threatening emergency to the triager    Negative: SEVERE abdominal pain    Negative: [1] SEVERE vaginal bleeding (i.e., soaking 2 pads / hour, large blood clots) AND [2] present 2 or more hours    Negative: SEVERE dizziness (e.g., unable to stand, requires support to walk, feels like passing out)    Negative: [1] MODERATE vaginal bleeding (i.e., soaking 1 pad / hour; clots) AND [2] present > 6 hours    Negative: [1] MODERATE vaginal bleeding (i.e., soaking 1 pad / hour; clots) AND [2] pregnant > 12 weeks    Negative: Passed tissue (e.g., gray-white)    Negative: Shoulder pain    Negative: Pale skin (pallor) of new onset or worsening    Negative: Patient sounds very sick or weak to the triager    Negative: [1] Constant abdominal pain AND [2] present > 2 hours    Negative: Fever > 100.4 F (38.0 C)    [1] Intermittent lower abdominal pain (e.g., cramping) AND [2] present > 24 hours    Protocols used: PREGNANCY - VAGINAL BLEEDING LESS THAN 20 WEEKS [facility]-

## 2020-08-23 ENCOUNTER — APPOINTMENT (OUTPATIENT)
Dept: ULTRASOUND IMAGING | Facility: CLINIC | Age: 30
End: 2020-08-23
Attending: FAMILY MEDICINE
Payer: COMMERCIAL

## 2020-08-23 ENCOUNTER — NURSE TRIAGE (OUTPATIENT)
Dept: NURSING | Facility: CLINIC | Age: 30
End: 2020-08-23

## 2020-08-23 ENCOUNTER — HOSPITAL ENCOUNTER (EMERGENCY)
Facility: CLINIC | Age: 30
Discharge: HOME | End: 2020-08-23
Attending: FAMILY MEDICINE | Admitting: FAMILY MEDICINE
Payer: COMMERCIAL

## 2020-08-23 VITALS
OXYGEN SATURATION: 98 % | RESPIRATION RATE: 18 BRPM | HEART RATE: 80 BPM | TEMPERATURE: 99 F | SYSTOLIC BLOOD PRESSURE: 126 MMHG | DIASTOLIC BLOOD PRESSURE: 73 MMHG

## 2020-08-23 DIAGNOSIS — O20.0 THREATENED ABORTION: ICD-10-CM

## 2020-08-23 LAB — B-HCG SERPL-ACNC: 265 IU/L (ref 0–5)

## 2020-08-23 PROCEDURE — 76817 TRANSVAGINAL US OBSTETRIC: CPT

## 2020-08-23 PROCEDURE — 99284 EMERGENCY DEPT VISIT MOD MDM: CPT | Mod: Z6 | Performed by: FAMILY MEDICINE

## 2020-08-23 PROCEDURE — 99284 EMERGENCY DEPT VISIT MOD MDM: CPT | Performed by: FAMILY MEDICINE

## 2020-08-23 PROCEDURE — 84702 CHORIONIC GONADOTROPIN TEST: CPT | Performed by: FAMILY MEDICINE

## 2020-08-23 NOTE — ED TRIAGE NOTES
Patient states she is 7wk 4d pregnant, noted vaginal bleeding 08/21/2020 afternoon, passed clots yesterday and this am passed a larger clot.  Patient was experiencing cramping until the last larger clot was passed.

## 2020-08-23 NOTE — ED AVS SNAPSHOT
[facility] Emergency Department  [address]   [place] [address]  Phone:  [phone]  Fax:  [phone]                                    Debo Do   MRN: [id number]    Department:  [facility] Emergency Department   Date of Visit:  8/23/2020           After Visit Summary Signature Page    I have received my discharge instructions, and my questions have been answered. I have discussed any challenges I see with this plan with the nurse or doctor.    ..........................................................................................................................................  Patient/Patient Representative Signature      ..........................................................................................................................................  Patient Representative Print Name and Relationship to Patient    ..................................................               ................................................  Date                                   Time    ..........................................................................................................................................  Reviewed by Signature/Title    ...................................................              ..............................................  Date                                               Time          22EPIC Rev 08/18

## 2020-08-23 NOTE — ED PROVIDER NOTES
"  History     Chief Complaint   Patient presents with     Vaginal Bleeding     HPI  Debo Do is a 30 year old female who is  1 para 0 at 7 weeks 4 days by LMP who presents with vaginal bleeding starting 2 days ago.  Early this morning she did pass a large clot and some dark blood versus tissue.  This is her first pregnancy.  She is continuing to have some menstrual-like cramping but no other pain.    Allergies:  No Known Allergies    Problem List:    Patient Active Problem List    Diagnosis Date Noted     Gastroesophageal reflux disease, esophagitis presence not specified 2020     Priority: Medium     DIGNA (generalized anxiety disorder) 2020     Priority: Medium        Past Medical History:    Past Medical History:   Diagnosis Date     DIGNA (generalized anxiety disorder) 3/13/2020     Gastroesophageal reflux disease, esophagitis presence not specified 3/13/2020       Past Surgical History:    Past Surgical History:   Procedure Laterality Date     ENDOSCOPY       HC TOOTH EXTRACTION W/FORCEP         Family History:    Family History   Problem Relation Age of Onset     Hypertension Mother      Hyperlipidemia Father      No Known Problems Maternal Grandmother      No Known Problems Maternal Grandfather      Dementia Paternal Grandmother         Possibly alzheimers     No Known Problems Paternal Grandfather      No Known Problems Sister      Diabetes Half-Sister         \"pre\"     No Known Problems Half-Sister      No Known Problems Half-Brother        Social History:  Marital Status:   [2]  Social History     Tobacco Use     Smoking status: Former Smoker     Smokeless tobacco: Never Used     Tobacco comment: quit in Oct   Substance Use Topics     Alcohol use: Not Currently     Frequency: Never     Drug use: Not Currently        Medications:    escitalopram (LEXAPRO) 20 MG tablet  LORazepam (ATIVAN) 1 MG tablet  omeprazole (PRILOSEC) 40 MG DR capsule  Prenatal Vit-Fe Fumarate-FA (PRENATAL " MULTIVITAMIN W/IRON) 27-0.8 MG tablet  vitamin C-electrolytes (EMERGEN-C) 1000mg vitamin C super orange drink mix          Review of Systems   All other systems reviewed and are negative.      Physical Exam   BP: 126/73  Pulse: 80  Temp: 99  F (37.2  C)  Resp: 18  SpO2: 98 %      Physical Exam  Vitals signs and nursing note reviewed.   Constitutional:       Appearance: She is obese. She is not ill-appearing.   HENT:      Head: Normocephalic and atraumatic.      Nose: Nose normal.   Eyes:      Conjunctiva/sclera: Conjunctivae normal.   Neck:      Musculoskeletal: Normal range of motion.   Cardiovascular:      Rate and Rhythm: Normal rate.   Pulmonary:      Effort: Pulmonary effort is normal.   Skin:     General: Skin is warm.      Capillary Refill: Capillary refill takes less than 2 seconds.   Neurological:      General: No focal deficit present.      Mental Status: She is alert and oriented to person, place, and time.   Psychiatric:         Mood and Affect: Mood normal.         Behavior: Behavior normal.         ED Course        Procedures               Critical Care time:  none               Results for orders placed or performed during the hospital encounter of 08/23/20 (from the past 24 hour(s))   US OB <14 Weeks W Transvaginal    Narrative    ULTRASOUND OB LESS THAN 14 WEEKS WITH TRANSVAGINAL SINGLE IMAGING   8/23/2020 8:26 AM    CLINICAL HISTORY: Heavy vaginal bleeding.    TECHNIQUE: Transabdominal scans were performed. Endovaginal ultrasound  was performed to better visualize the embryo.    COMPARISON: None.    FINDINGS:  Uterus/endometrium: Uterus measures 7.8 x 3.5 x 4.8 cm. Thickened  endometrium measuring at least 10 mm with a small cystic focus in the  endometrial complex at the level of fundus measuring 0.4 cm. This may  represent a very early gestational sac. No yolk sac or fetal pole  noted. If this were to represent a gestational sac this would  correlate to a gestational age of 4 weeks and 6  days.    RIGHT OVARY: Unremarkable.  LEFT OVARY: Unremarkable.      Impression    IMPRESSION:   1.  Thickened endometrium with a small cystic focus in the endometrial  complex at the level of fundus measuring 0.4 cm. This may represent a  very early gestational sac. No yolk sac or fetal pole noted. If this  were to represent a gestational sac this would correlate to  gestational age of 4 weeks and 6 days. Given these findings, continued  follow-up ultrasound is recommended to assess for development of a  viable intrauterine pregnancy along with serial beta hCG follow-up.  Ectopic pregnancy not excluded in the presence of a positive pregnancy  test.  2.  Ovaries unremarkable on gray scale imaging.  3.  No significant free fluid.    CURT L BEHRNS, MD   HCG quantitative pregnancy   Result Value Ref Range    HCG Quantitative Serum 265 (H) 0 - 5 IU/L       Medications - No data to display    Assessments & Plan (with Medical Decision Making)   MDM--30-year-old  1 para 0 who presents expected to be at 7+ weeks by last menstrual period and heavy vaginal bleeding with cramping and clots.  She has a 0.4 cm cystic focus and a thickened endometrium.  Serum hCG is only 265.  I suspect patient is miscarrying and has a blighted ovum.  She has an OB appointment tomorrow and recommended she follow-up for repeat hCG and to discuss further care.  Patient and spouse are comfortable with this treatment plan and patient discharged in stable condition.  I have reviewed the nursing notes.    I have reviewed the findings, diagnosis, plan and need for follow up with the patient.       New Prescriptions    No medications on file       Final diagnoses:   Threatened        2020   [facility] EMERGENCY DEPARTMENT     Giovanna, Matt CHOW MD  [phone]

## 2020-08-23 NOTE — DISCHARGE INSTRUCTIONS
See your OB as planned tomorrow.  You may need repeat blood work.  You are probably miscarrying and can expect heavy vaginal bleeding and cramping.

## 2020-08-23 NOTE — TELEPHONE ENCOUNTER
Patient states 7 weeks pregnant. Has had abdominal cramping x 2 days.    She thinks she had a miscarriage at 4:30-5:00am today.   Passed black tissue by report.  Currently reports light pink bleeding. No clots.  States cramping stopped after passing tissue.  No dizziness. No nausea or vomiting.     Protocol-  - Threatened Miscarriage Follow up Call  Care advice reviewed.   Disposition-  Per [id number]  Advised ED.  Caller states understanding of the recommended disposition.   Caller plans to go now to [facility].  Advised to call back if further questions or concerns.     ADIEL Ortiz RN  [facility]     Reason for Disposition    Passed tissue (e.g., gray-white)    Additional Information    Negative: Shock suspected (e.g., cold/pale/clammy skin, too weak to stand, low BP, rapid pulse)    Negative: Difficult to awaken or acting confused (e.g., disoriented, slurred speech)    Negative: Passed out (i.e., lost consciousness, collapsed and was not responding)    Negative: Sounds like a life-threatening emergency to the triager    Negative: SEVERE abdominal pain    Negative: [1] SEVERE vaginal bleeding (i.e., soaking 2 pads / hour, large blood clots) AND [2] present 2 or more hours    Negative: [1] MODERATE vaginal bleeding (i.e., soaking 1 pad / hour; clots) AND [2] present > 6 hours    Protocols used:  - THREATENED MISCARRIAGE FOLLOW-UP CALL-A-

## 2020-08-24 ENCOUNTER — PRENATAL OFFICE VISIT (OUTPATIENT)
Dept: OBGYN | Facility: CLINIC | Age: 30
End: 2020-08-24
Payer: COMMERCIAL

## 2020-08-24 VITALS
WEIGHT: 226.8 LBS | BODY MASS INDEX: 41.48 KG/M2 | HEART RATE: 62 BPM | DIASTOLIC BLOOD PRESSURE: 80 MMHG | TEMPERATURE: 98.1 F | SYSTOLIC BLOOD PRESSURE: 126 MMHG

## 2020-08-24 DIAGNOSIS — O20.0 THREATENED ABORTION: Primary | ICD-10-CM

## 2020-08-24 PROCEDURE — 99213 OFFICE O/P EST LOW 20 MIN: CPT | Performed by: OBSTETRICS & GYNECOLOGY

## 2020-08-24 NOTE — PROGRESS NOTES
Threatened AB    Debo Do is a 30 year old  at 7w5d by LMP who presents today for ER follow-up, possible miscarriage.    Patient seen in the ER on  for vaginal bleeding. Patient states that she has passed a large clot with dark blood vs tissue prior to arrival in the ER. She had also been having some cramping similar to menses. In the ER. Her HCG level was 265 and a pelvic ultrasound was performed showing a thickened endometrium with small cystic focus measuring 0.4cm. Gestational sac possible, without any fetal pole pr yolk sac. If this dated the pregnancy, then she would be 5 weeks and 0 days today. This was the first ultrasound of this pregnancy.    Since being seen in the ER, she has been doing ok. She has continued to have dark red bleeding that does not fill pad. No further clots. Some light cramping as well. She denies any fevers, chills, chest pain, shortness of breath, nausea or vomiting.      Pregnancy complicated by:  -Anxiety: Stopped Lorazepam a few months ago, still on Lexapro. Doing well.     Have you travelled during the pregnancy? [place] early pregnancy  Have your sexual partner(s) travelled during the pregnancy? [place]      Ob Hx:     Past Medical History of Father of Baby: No significant medical history. Needed a hernia repair as an infant.    Gyn Hx: Patient's last menstrual period was 2020.     Last pap was 8/10, NIL   STI history denies      Family history genetic disorders, cystic fibrosis, SMA, intellectual disability or autism- denies      There is no immunization history on file for this patient.     PMH:   Past Medical History:   Diagnosis Date     DIGNA (generalized anxiety disorder) 3/13/2020     Gastroesophageal reflux disease, esophagitis presence not specified 3/13/2020       SurgHx:   Past Surgical History:   Procedure Laterality Date     ENDOSCOPY       HC TOOTH EXTRACTION W/FORCEP         FamHx:   Family History   Problem Relation Age of Onset      "Hypertension Mother      Hyperlipidemia Father      No Known Problems Maternal Grandmother      No Known Problems Maternal Grandfather      Dementia Paternal Grandmother         Possibly alzheimers     No Known Problems Paternal Grandfather      No Known Problems Sister      Diabetes Half-Sister         \"pre\"     No Known Problems Half-Sister      No Known Problems Half-Brother        SocHx:   Social History     Socioeconomic History     Marital status:      Spouse name: Not on file     Number of children: Not on file     Years of education: Not on file     Highest education level: Not on file   Occupational History     Not on file   Social Needs     Financial resource strain: Not on file     Food insecurity     Worry: Not on file     Inability: Not on file     Transportation needs     Medical: Not on file     Non-medical: Not on file   Tobacco Use     Smoking status: Former Smoker     Smokeless tobacco: Never Used     Tobacco comment: quit in Oct   Substance and Sexual Activity     Alcohol use: Not Currently     Frequency: Never     Drug use: Not Currently     Sexual activity: Yes     Partners: Male   Lifestyle     Physical activity     Days per week: Not on file     Minutes per session: Not on file     Stress: Not on file   Relationships     Social connections     Talks on phone: Not on file     Gets together: Not on file     Attends Jew service: Not on file     Active member of club or organization: Not on file     Attends meetings of clubs or organizations: Not on file     Relationship status: Not on file     Intimate partner violence     Fear of current or ex partner: Not on file     Emotionally abused: Not on file     Physically abused: Not on file     Forced sexual activity: Not on file   Other Topics Concern     Not on file   Social History Narrative    8/2020  Lives in [place] with , Lance.  No indoor cats/kittens.  Debo stopped smoking (cigars) 11/2019.  Reports she started again " was smoking about 1 month before she knew she was pregnant.  No concerns about domestic violence.  Debo is a  and  in a gym.         Allergies:   Patient has no known allergies.    Medications:   escitalopram (LEXAPRO) 20 MG tablet, Take 1 tablet (20 mg) by mouth daily  omeprazole (PRILOSEC) 40 MG DR capsule, Take 1 capsule (40 mg) by mouth daily  Prenatal Vit-Fe Fumarate-FA (PRENATAL MULTIVITAMIN W/IRON) 27-0.8 MG tablet, Take 1 tablet by mouth daily  LORazepam (ATIVAN) 1 MG tablet, Take 1 mg by mouth every 6 hours as needed for anxiety  vitamin C-electrolytes (EMERGEN-C) 1000mg vitamin C super orange drink mix, Take 1 packet by mouth daily Mix 1 packet in 4-6oz water and take once or twice daily or as directed.    No current facility-administered medications on file prior to visit.       Past medical, surgical, social and family history were reviewed and updated in Epic.      ROS: As described in HPI, otherwise negative for fever/chills, fatigue, dizziness, changes or new deficits in vision, worrisome rashes, new lumps or masses, cough/SOB/CP, GI distress, dysuria, abnormal vaginal discharge, constipation/diarrhea, neurological deficits, or other systemic complaints    EXAM:  Vitals: /80   Pulse 62   Temp 98.1  F (36.7  C) (Tympanic)   Wt 102.9 kg (226 lb 12.8 oz)   LMP 07/01/2020   BMI 41.48 kg/m    BMI= Body mass index is 41.48 kg/m .        Gen: Alert, oriented, appropriately interactive, NAD  Chest: Symmetrical, unlabored breathing  Abdomen: soft, non tender, non distended, no masses, no hernias. No inguinal lymphadenopathy.   External genitalia: no lesions; normal appearing external genitalia, bartholins glands, urethra, skenes glands  Vagina: no masses or lesions or discharge, normally rugated. Scant dark red blood in vaginal vault  Cervix: no masses or lesions . Visually closed.  Bimanual exam:   Nontender pelvic floor muscles  Urethra: nontender   Bladder: nontender and  without massess, well supported   Uterus: midline, anteverted, small, mobile  no masses, non-tender  Adnexa: no masses or tenderness appreciated   No cervical motion tenderness. Cervix closed  MSK: normal gait, symmetric movements UE & LE  Lower extremities: non-tender, no edema        Labs & Imaging:    CBC RESULTS:   Recent Labs   Lab Test 05/15/20  1438   WBC 6.3   RBC 4.75   HGB 12.6   HCT 40.0   MCV 84   MCH 26.5   MCHC 31.5   RDW 14.3        HCG Quantitative Serum   Date Value Ref Range Status   08/23/2020 265 (H) 0 - 5 IU/L Final      .cbc  Results for orders placed or performed during the hospital encounter of 08/23/20   US OB <14 Weeks W Transvaginal    Narrative    ULTRASOUND OB LESS THAN 14 WEEKS WITH TRANSVAGINAL SINGLE IMAGING   8/23/2020 8:26 AM    CLINICAL HISTORY: Heavy vaginal bleeding.    TECHNIQUE: Transabdominal scans were performed. Endovaginal ultrasound  was performed to better visualize the embryo.    COMPARISON: None.    FINDINGS:  Uterus/endometrium: Uterus measures 7.8 x 3.5 x 4.8 cm. Thickened  endometrium measuring at least 10 mm with a small cystic focus in the  endometrial complex at the level of fundus measuring 0.4 cm. This may  represent a very early gestational sac. No yolk sac or fetal pole  noted. If this were to represent a gestational sac this would  correlate to a gestational age of 4 weeks and 6 days.    RIGHT OVARY: Unremarkable.  LEFT OVARY: Unremarkable.      Impression    IMPRESSION:   1.  Thickened endometrium with a small cystic focus in the endometrial  complex at the level of fundus measuring 0.4 cm. This may represent a  very early gestational sac. No yolk sac or fetal pole noted. If this  were to represent a gestational sac this would correlate to  gestational age of 4 weeks and 6 days. Given these findings, continued  follow-up ultrasound is recommended to assess for development of a  viable intrauterine pregnancy along with serial beta hCG  follow-up.  Ectopic pregnancy not excluded in the presence of a positive pregnancy  test.  2.  Ovaries unremarkable on gray scale imaging.  3.  No significant free fluid.    CURT L BEHRNS, MD            ASSESSMENT/PLAN: Debo Do is a 30 year old  at 7w5d by LMP, however, 5wk 0d by early ultrasound here for threatened AB          ICD-10-CM    1. Threatened   O20.0 HCG Quantitative Pregnancy, Blood (OHY285)        Given history, possible miscarriage likely. However, cannot rule out very early pregnancy. Ultrasound yesterday in the ER shows possible gestational sac without fetal pole or yolk sac. . Recommending repeat HCG 48-72 hours from last. If trending up appropriately, can repeat ultrasound in 10-14 days. Miscarriage precautions reviewed and encouraged patient to complete prenatal labs, especially type and screen to find out blood type in event rhogam indicated.    Reviewed that miscarriage occurs ~ 1 in 5 pregnancy events and that there was no direct event or prevention  that the patient could have avoided or performed.  Discussed that there are many etiologies for miscarriage, the most common being a genetic anomaly.  Reviewed that risk of miscarriage for next pregnancy is not elevated by the current event.    Reviewed options if miscarriage is occurring, including expectant management, D&C, and medical therapy (cytotec).  Reviewed risks and benefits of all options.  All questions answered.        Inga Salazar DO  8/10/2020 11:55 AM

## 2020-08-25 DIAGNOSIS — O20.0 THREATENED ABORTION: ICD-10-CM

## 2020-08-25 DIAGNOSIS — Z34.90 SUPERVISION OF NORMAL PREGNANCY: ICD-10-CM

## 2020-08-25 LAB
ABO + RH BLD: NORMAL
ABO + RH BLD: NORMAL
B-HCG SERPL-ACNC: 68 IU/L (ref 0–5)
BLD GP AB SCN SERPL QL: NORMAL
BLOOD BANK CMNT PATIENT-IMP: NORMAL
ERYTHROCYTE [DISTWIDTH] IN BLOOD BY AUTOMATED COUNT: 14.7 % (ref 10–15)
HBV SURFACE AG SERPL QL IA: NONREACTIVE
HCT VFR BLD AUTO: 36.9 % (ref 35–47)
HGB BLD-MCNC: 11.4 G/DL (ref 11.7–15.7)
HIV 1+2 AB+HIV1 P24 AG SERPL QL IA: NONREACTIVE
MCH RBC QN AUTO: 25.4 PG (ref 26.5–33)
MCHC RBC AUTO-ENTMCNC: 30.9 G/DL (ref 31.5–36.5)
MCV RBC AUTO: 82 FL (ref 78–100)
PLATELET # BLD AUTO: 255 10E9/L (ref 150–450)
RBC # BLD AUTO: 4.49 10E12/L (ref 3.8–5.2)
SPECIMEN EXP DATE BLD: NORMAL
WBC # BLD AUTO: 6.6 10E9/L (ref 4–11)

## 2020-08-25 PROCEDURE — 86900 BLOOD TYPING SEROLOGIC ABO: CPT | Performed by: OBSTETRICS & GYNECOLOGY

## 2020-08-25 PROCEDURE — 85027 COMPLETE CBC AUTOMATED: CPT | Performed by: OBSTETRICS & GYNECOLOGY

## 2020-08-25 PROCEDURE — 86901 BLOOD TYPING SEROLOGIC RH(D): CPT | Performed by: OBSTETRICS & GYNECOLOGY

## 2020-08-25 PROCEDURE — 86850 RBC ANTIBODY SCREEN: CPT | Performed by: OBSTETRICS & GYNECOLOGY

## 2020-08-25 PROCEDURE — 84702 CHORIONIC GONADOTROPIN TEST: CPT | Performed by: OBSTETRICS & GYNECOLOGY

## 2020-08-25 PROCEDURE — 87389 HIV-1 AG W/HIV-1&-2 AB AG IA: CPT | Performed by: OBSTETRICS & GYNECOLOGY

## 2020-08-25 PROCEDURE — 36415 COLL VENOUS BLD VENIPUNCTURE: CPT | Performed by: OBSTETRICS & GYNECOLOGY

## 2020-08-25 PROCEDURE — 86762 RUBELLA ANTIBODY: CPT | Performed by: OBSTETRICS & GYNECOLOGY

## 2020-08-25 PROCEDURE — 86780 TREPONEMA PALLIDUM: CPT | Performed by: OBSTETRICS & GYNECOLOGY

## 2020-08-25 PROCEDURE — 87340 HEPATITIS B SURFACE AG IA: CPT | Performed by: OBSTETRICS & GYNECOLOGY

## 2020-08-26 LAB
RUBV IGG SERPL IA-ACNC: 86 IU/ML
T PALLIDUM AB SER QL: NONREACTIVE

## 2020-08-31 ENCOUNTER — ALLIED HEALTH/NURSE VISIT (OUTPATIENT)
Dept: FAMILY MEDICINE | Facility: OTHER | Age: 30
End: 2020-08-31
Payer: COMMERCIAL

## 2020-08-31 DIAGNOSIS — O20.0 THREATENED ABORTION: Primary | ICD-10-CM

## 2020-08-31 PROCEDURE — 99207 ZZC NO CHARGE NURSE ONLY: CPT

## 2020-08-31 PROCEDURE — 96372 THER/PROPH/DIAG INJ SC/IM: CPT

## 2020-08-31 NOTE — NURSING NOTE
Prior to immunization administration, verified patients identity using patient s name and date of birth. Please see Immunization Activity for additional information.     Screening Questionnaire for Adult Immunization    Are you sick today?   No   Do you have allergies to medications, food, a vaccine component or latex?   No   Have you ever had a serious reaction after receiving a vaccination?   No   Do you have a long-term health problem with heart, lung, kidney, or metabolic disease (e.g., diabetes), asthma, a blood disorder, no spleen, complement component deficiency, a cochlear implant, or a spinal fluid leak?  Are you on long-term aspirin therapy?   No   Do you have cancer, leukemia, HIV/AIDS, or any other immune system problem?   No   Do you have a parent, brother, or sister with an immune system problem?   No   In the past 3 months, have you taken medications that affect  your immune system, such as prednisone, other steroids, or anticancer drugs; drugs for the treatment of rheumatoid arthritis, Crohn s disease, or psoriasis; or have you had radiation treatments?   No   Have you had a seizure, or a brain or other nervous system problem?   No   During the past year, have you received a transfusion of blood or blood    products, or been given immune (gamma) globulin or antiviral drug?   No   For women: Are you pregnant or is there a chance you could become       pregnant during the next month?   No   Have you received any vaccinations in the past 4 weeks?   No     Immunization questionnaire answers were all negative.        Per orders of Dr. Salazar, injection of Rhogam given by Susan Alston MA. Patient instructed to remain in clinic for 15 minutes afterwards, and to report any adverse reaction to me immediately.    Screening performed by Susan Aslton MA on 8/31/2020 at 9:33 AM.  The following medication was given:     MEDICATION: RhoGam 300 ug  ROUTE: IM  SITE: LUQ - Gluteus  DOSE: 300ug  LOT #:  [id number]  :  Jerry  EXPIRATION DATE:  03/08/2022  NDC#: 4817-2790-19     Susan Alston MA on 8/31/2020 at 9:35 AM

## 2021-01-04 ENCOUNTER — MYC MEDICAL ADVICE (OUTPATIENT)
Dept: INTERNAL MEDICINE | Facility: CLINIC | Age: 31
End: 2021-01-04

## 2021-01-04 ENCOUNTER — HEALTH MAINTENANCE LETTER (OUTPATIENT)
Age: 31
End: 2021-01-04

## 2021-01-04 DIAGNOSIS — F41.1 GAD (GENERALIZED ANXIETY DISORDER): ICD-10-CM

## 2021-01-04 DIAGNOSIS — K21.9 GASTROESOPHAGEAL REFLUX DISEASE: Primary | ICD-10-CM

## 2021-01-04 DIAGNOSIS — K21.9 GASTROESOPHAGEAL REFLUX DISEASE, UNSPECIFIED WHETHER ESOPHAGITIS PRESENT: ICD-10-CM

## 2021-01-05 ENCOUNTER — MYC MEDICAL ADVICE (OUTPATIENT)
Dept: INTERNAL MEDICINE | Facility: CLINIC | Age: 31
End: 2021-01-05

## 2021-01-05 RX ORDER — ESCITALOPRAM OXALATE 20 MG/1
20 TABLET ORAL DAILY
Qty: 30 TABLET | Refills: 0 | Status: SHIPPED | OUTPATIENT
Start: 2021-01-05 | End: 2021-02-08

## 2021-01-05 RX ORDER — OMEPRAZOLE 40 MG/1
40 CAPSULE, DELAYED RELEASE ORAL DAILY
Qty: 30 CAPSULE | Refills: 1 | Status: SHIPPED | OUTPATIENT
Start: 2021-01-05 | End: 2021-02-08

## 2021-01-05 NOTE — TELEPHONE ENCOUNTER
Pt asking for refill request but did not state what medication. Sent message back to clarify. There is no denial or request from the pharmacy.

## 2021-01-05 NOTE — TELEPHONE ENCOUNTER
Pt sent message back that she is running out of medication.     Pended.     Last OV on 5/12/20.     She has moved back to [place]. Not sure if permanent or temp.

## 2021-01-05 NOTE — TELEPHONE ENCOUNTER
She had a VV scheduled today. But patient is in [place]. She is 7-8 weeks pregnancy and has an appointment to see Ob  Today. lexapro is working well for her anxiety and needed refill on that.   Explained about pregnancy and SSRI use. Also will check with pharmacy to make sure its safe to continue. Refilled 1 month of lexapro and omeprazole.  Will cancel the virtual visit.    Lionel Fan MD

## 2021-02-08 ENCOUNTER — MYC MEDICAL ADVICE (OUTPATIENT)
Dept: INTERNAL MEDICINE | Facility: CLINIC | Age: 31
End: 2021-02-08

## 2021-02-08 DIAGNOSIS — K21.9 GASTROESOPHAGEAL REFLUX DISEASE, UNSPECIFIED WHETHER ESOPHAGITIS PRESENT: ICD-10-CM

## 2021-02-08 DIAGNOSIS — F41.1 GAD (GENERALIZED ANXIETY DISORDER): ICD-10-CM

## 2021-02-08 RX ORDER — OMEPRAZOLE 40 MG/1
40 CAPSULE, DELAYED RELEASE ORAL DAILY
Qty: 90 CAPSULE | Refills: 0 | Status: SHIPPED | OUTPATIENT
Start: 2021-02-08 | End: 2021-05-11

## 2021-02-08 RX ORDER — ESCITALOPRAM OXALATE 20 MG/1
20 TABLET ORAL DAILY
Qty: 90 TABLET | Refills: 0 | Status: SHIPPED | OUTPATIENT
Start: 2021-02-08 | End: 2021-05-11

## 2021-02-08 NOTE — TELEPHONE ENCOUNTER
Omeprazole and Lexapro refill request.   She is asking for 90 day refills.     She is almost out.   She scheduled VV on Thursday, but not sure if she is Out of state?

## 2021-02-09 ENCOUNTER — MYC MEDICAL ADVICE (OUTPATIENT)
Dept: INTERNAL MEDICINE | Facility: CLINIC | Age: 31
End: 2021-02-09

## 2021-02-11 ENCOUNTER — VIRTUAL VISIT (OUTPATIENT)
Dept: INTERNAL MEDICINE | Facility: CLINIC | Age: 31
End: 2021-02-11

## 2021-02-11 DIAGNOSIS — F41.1 GAD (GENERALIZED ANXIETY DISORDER): ICD-10-CM

## 2021-02-11 DIAGNOSIS — F41.0 PANIC ATTACK: Primary | ICD-10-CM

## 2021-02-11 PROCEDURE — 99213 OFFICE O/P EST LOW 20 MIN: CPT | Mod: 95 | Performed by: INTERNAL MEDICINE

## 2021-02-11 RX ORDER — LORAZEPAM 1 MG/1
1 TABLET ORAL DAILY PRN
Qty: 20 TABLET | Refills: 0 | Status: SHIPPED | OUTPATIENT
Start: 2021-02-11 | End: 2021-03-22

## 2021-02-11 ASSESSMENT — ANXIETY QUESTIONNAIRES
GAD7 TOTAL SCORE: 4
2. NOT BEING ABLE TO STOP OR CONTROL WORRYING: NOT AT ALL
5. BEING SO RESTLESS THAT IT IS HARD TO SIT STILL: NOT AT ALL
1. FEELING NERVOUS, ANXIOUS, OR ON EDGE: SEVERAL DAYS
IF YOU CHECKED OFF ANY PROBLEMS ON THIS QUESTIONNAIRE, HOW DIFFICULT HAVE THESE PROBLEMS MADE IT FOR YOU TO DO YOUR WORK, TAKE CARE OF THINGS AT HOME, OR GET ALONG WITH OTHER PEOPLE: SOMEWHAT DIFFICULT
6. BECOMING EASILY ANNOYED OR IRRITABLE: NOT AT ALL
7. FEELING AFRAID AS IF SOMETHING AWFUL MIGHT HAPPEN: NOT AT ALL
3. WORRYING TOO MUCH ABOUT DIFFERENT THINGS: NEARLY EVERY DAY

## 2021-02-11 ASSESSMENT — ENCOUNTER SYMPTOMS
AGITATION: 0
NERVOUS/ANXIOUS: 1
SLEEP DISTURBANCE: 0

## 2021-02-11 ASSESSMENT — PATIENT HEALTH QUESTIONNAIRE - PHQ9: 5. POOR APPETITE OR OVEREATING: NOT AT ALL

## 2021-02-11 NOTE — PATIENT INSTRUCTIONS
Patient Education     Panic Attack  A panic attack is an extreme fear reaction that comes on for no clear reason. There is often a fear that something terrible will happen or that you may die. The attack may last a few minutes up to a few hours. Between attacks, things will seem quite normal. This condition has a psychological cause and can be treated with the help of a therapist or psychiatrist. Medicine can be very helpful for this problem.  Panic attacks usually come on suddenly, reaches a peak within minutes, and includes at least 4 of these symptoms:    Palpitations, pounding heart, or accelerated heart rate    Sweating    Chills or heat sensations    Trembling or shaking    Sensations of shortness of breath or smothering    Feelings of choking    Chest pain or discomfort    Nausea or abdominal distress    Feeling dizzy, unsteady, light-headed, or faint    Numbness or tingling sensations    Fear of dying    Fear of going crazy or of losing control    Feelings of unreality, strangeness, or detachment from the environment  Many of these symptoms can be linked to physical problems, so it is sometimes necessary to rule out conditions like thyroid disorders, heart disease, gastrointestinal problems, and others. They can also start as physical symptoms, but psychologically we may react to them in a fearful way, worsening the way we react and feel.  Home care    Try to find the sources of stress in your life. They may not be obvious. These may include:  ? Daily hassles of life which pile up (traffic jams, missed appointments, car troubles).  ? Major life changes, both good (new baby, job promotion) and bad (loss of job, loss of loved one).  ? Feeling that you have too many responsibilities and can't take care of everything at once.  ? Helplessness: feeling like your problems are too much for you to handle.    Notice how your body reacts to stress. Learn to listen to your body signals so that you can take action before  the stress becomes severe.    Try to be aware of what you were doing before the reaction started; this may give you clues to things that can trigger a reaction. It may be situations in your life, or what you were doing at the time.    When possible, avoid or reduce the cause of stress. Avoid hassles, limit the amount of change that is happening in your life at one time or take a break when you feel overloaded.    Unfortunately, you can't stay away from many stressful situations. So you need to learn how to manage stress better. Many proven methods will reduce your anxiety. These include simple things like exercise, good nutrition, and adequate rest. Also, there are certain techniques that are helpful: relaxation and breathing exercises, visualization, biofeedback, meditation, or simply taking time-out to clear your mind. For more information about this, ask your doctor or go to a local bookstore and review the many books and tapes available on this subject.  Follow-up care  Follow-up with your healthcare provider, or as advised.  Call 911  Call 911 if you:    Have suicidal thoughts, a suicide plan, and the means to carry out the plan    Have serious thoughts of hurting someone else    Have trouble breathing    Are very confused    Feel very drowsy or have trouble awakening    Faint or lose consciousness    Have new chest pain that becomes more severe, lasts longer, or spreads into your shoulder, arm, neck, jaw, or back    Have a very rapid or irregular heartbeat    Have a seizure  When to seek medical advice  Call your healthcare provider right away if any of these occur:    Worsening of your symptoms to the point of feeling out-of-control    Feeling that you may try to harm yourself or another    Can't sleep or eat for 3 days in a row    Increased pain with breathing    Increasing feeling of weakness or dizziness    Cough with dark colored sputum (phlegm) or blood    Fever of 100.4 F (38 C) or higher, or as  directed by your healthcare provider    Swelling, pain, or redness in one leg    Requests by family or friends for you to seek help for your symptoms  Cassi last reviewed this educational content on 3/1/2018    9801-7118 [employer]. [address] [place] [address], [place] [address]. All rights reserved. This information is not intended as a substitute for professional medical care. Always follow your healthcare professional's instructions.

## 2021-02-11 NOTE — PROGRESS NOTES
"Debo is a 30 year old who is being evaluated via a billable video visit.      How would you like to obtain your AVS? Mail a copy  If the video visit is dropped, the invitation should be resent by: Text to cell phone: [phone]  Will anyone else be joining your video visit? No    Video Start Time: 8:55 AM    Assessment & Plan   Problem List Items Addressed This Visit        Behavioral    DIGNA (generalized anxiety disorder)    Relevant Medications    LORazepam (ATIVAN) 1 MG tablet      Other Visit Diagnoses     Panic attack    -  Primary    Relevant Medications    LORazepam (ATIVAN) 1 MG tablet         Situational stress worsening anxiety and panic attacks. Advised to continue lexapro and use lorazepam as needed.      Lionel Fan MD  [facility]    Ted Edmondson is a 30 year old who presents for the following health issues   Patient is being seen for an medication check.    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 Under a lot of stress. She gets panic attack and it upsets them.  continue to take escitalopram. She has been taking lorazepam every few days.    DIGNA-7 SCORE 3/13/2020 2/11/2021   Total Score 2 (minimal anxiety) -   Total Score 2 4       Review of Systems   Psychiatric/Behavioral: Negative for agitation, mood changes, self-injury, sleep disturbance and suicidal ideas. The patient is nervous/anxious.           Objective     Vitals:  No vitals were obtained today due to virtual visit.    Physical Exam   \"GENERAL: Healthy, alert and no distress\",\"EYES: Eyes grossly normal to inspection.  No discharge or erythema, or obvious scleral/conjunctival abnormalities.\"SKIN: Visible skin clear. No significant rash, abnormal pigmentation or lesions.\",\"NEURO: Cranial nerves grossly intact.  Mentation and speech appropriate for age.\",\"PSYCH: Mentation appears normal, affect normal/bright, judgement and insight intact, normal speech and appearance well-groomed.\"    "     Video-Visit Details    Type of service:  Video Visit    Video End Time:9:02 AM    Originating Location (pt. Location): Home    Distant Location (provider location):  HOME    Platform [facility] for [facility] Visit: [facility]

## 2021-02-12 ENCOUNTER — TELEPHONE (OUTPATIENT)
Dept: INTERNAL MEDICINE | Facility: CLINIC | Age: 31
End: 2021-02-12

## 2021-02-12 ASSESSMENT — ANXIETY QUESTIONNAIRES: GAD7 TOTAL SCORE: 4

## 2021-02-12 NOTE — TELEPHONE ENCOUNTER
Has she had an eye exam? If not recommend to schedule.   She can try Tylenol or Ibuprofen for the pressure. If not better will need recheck.

## 2021-02-12 NOTE — TELEPHONE ENCOUNTER
Pt calling requesting medical advice about right eye pressure she is experiencing. She had a virtual visit yesterday with Dr. Fna for a headache. The headache is now gone but she is experiencing the eye pressure. Pt can be reached at [phone]. OK to leave a detailed message. Please advise. Thanks.

## 2021-03-22 ENCOUNTER — MYC REFILL (OUTPATIENT)
Dept: INTERNAL MEDICINE | Facility: CLINIC | Age: 31
End: 2021-03-22

## 2021-03-22 DIAGNOSIS — F41.0 PANIC ATTACK: ICD-10-CM

## 2021-03-23 ENCOUNTER — MYC REFILL (OUTPATIENT)
Dept: INTERNAL MEDICINE | Facility: CLINIC | Age: 31
End: 2021-03-23

## 2021-03-23 ENCOUNTER — MYC MEDICAL ADVICE (OUTPATIENT)
Dept: INTERNAL MEDICINE | Facility: CLINIC | Age: 31
End: 2021-03-23

## 2021-03-23 DIAGNOSIS — F41.0 PANIC ATTACK: ICD-10-CM

## 2021-03-23 RX ORDER — LORAZEPAM 1 MG/1
1 TABLET ORAL DAILY PRN
Qty: 20 TABLET | Refills: 0 | Status: CANCELLED | OUTPATIENT
Start: 2021-03-23

## 2021-03-24 RX ORDER — LORAZEPAM 1 MG/1
1 TABLET ORAL DAILY PRN
Qty: 20 TABLET | Refills: 0 | Status: SHIPPED | OUTPATIENT
Start: 2021-03-24 | End: 2021-05-11

## 2021-03-24 NOTE — TELEPHONE ENCOUNTER
Controlled Substance Refill Request for Ativan   Problem List Complete:  No     PROVIDER TO CONSIDER COMPLETION OF PROBLEM LIST AND OVERVIEW/CONTROLLED SUBSTANCE AGREEMENT    Last Written Prescription Date:  2/11/21  Last Fill Quantity: 20,   # refills: 0    THE MOST RECENT OFFICE VISIT MUST BE WITHIN THE PAST 3 MONTHS. AT LEAST ONE FACE TO FACE VISIT MUST OCCUR EVERY 6 MONTHS. ADDITIONAL VISITS CAN BE VIRTUAL.  (THIS STATEMENT SHOULD BE DELETED.)    Last Office Visit with [facility] primary care provider: virtual visit 1/5/21    Future Office visit:     Controlled substance agreement:   Encounter-Level CSA:    There are no encounter-level csa.     Patient-Level CSA:    There are no patient-level csa.         Last Urine Drug Screen: No results found for: CDAUT, No results found for: COMDAT, No results found for: THC13, PCP13, COC13, MAMP13, OPI13, AMP13, BZO13, TCA13, MTD13, BAR13, OXY13, PPX13, BUP13     Processing:  Rx to be electronically transmitted to pharmacy by provider      [email or web address]      Not reviewed - provider has not provided access to this RN  Routing refill request to provider for review/approval because:  Drug not on the [facility] refill protocol

## 2021-05-11 ENCOUNTER — MYC REFILL (OUTPATIENT)
Dept: INTERNAL MEDICINE | Facility: CLINIC | Age: 31
End: 2021-05-11

## 2021-05-11 DIAGNOSIS — F41.0 PANIC ATTACK: ICD-10-CM

## 2021-05-11 DIAGNOSIS — K21.9 GASTROESOPHAGEAL REFLUX DISEASE, UNSPECIFIED WHETHER ESOPHAGITIS PRESENT: ICD-10-CM

## 2021-05-11 DIAGNOSIS — F41.1 GAD (GENERALIZED ANXIETY DISORDER): ICD-10-CM

## 2021-05-11 RX ORDER — ESCITALOPRAM OXALATE 20 MG/1
20 TABLET ORAL DAILY
Qty: 90 TABLET | Refills: 0 | OUTPATIENT
Start: 2021-05-11

## 2021-05-11 RX ORDER — ESCITALOPRAM OXALATE 20 MG/1
20 TABLET ORAL DAILY
Qty: 90 TABLET | Refills: 2 | Status: SHIPPED | OUTPATIENT
Start: 2021-05-11

## 2021-05-11 RX ORDER — LORAZEPAM 1 MG/1
1 TABLET ORAL DAILY PRN
Qty: 30 TABLET | Refills: 0 | Status: SHIPPED | OUTPATIENT
Start: 2021-05-11

## 2021-05-11 RX ORDER — OMEPRAZOLE 40 MG/1
40 CAPSULE, DELAYED RELEASE ORAL DAILY
Qty: 90 CAPSULE | Refills: 0 | OUTPATIENT
Start: 2021-05-11

## 2021-05-11 RX ORDER — OMEPRAZOLE 40 MG/1
40 CAPSULE, DELAYED RELEASE ORAL DAILY
Qty: 90 CAPSULE | Refills: 2 | Status: SHIPPED | OUTPATIENT
Start: 2021-05-11

## 2021-05-11 RX ORDER — LORAZEPAM 1 MG/1
1 TABLET ORAL DAILY PRN
Qty: 20 TABLET | Refills: 0 | OUTPATIENT
Start: 2021-05-11

## 2021-05-11 NOTE — TELEPHONE ENCOUNTER
Routing refill request to provider for review/approval because:  Drug not on the [facility] refill protocol     Last virtual appt was 2/11/21.    Pt almost out of lexapro.

## 2021-05-11 NOTE — TELEPHONE ENCOUNTER
Patient calls, is almost out of medication. Requested from the pharmacy last week and they did not contact us for refills. Please send ASAP

## 2021-05-11 NOTE — TELEPHONE ENCOUNTER
Patient is calling and she is leaving town Thursday and she only has one pill left of the escitalopram.

## 2021-10-10 ENCOUNTER — HEALTH MAINTENANCE LETTER (OUTPATIENT)
Age: 31
End: 2021-10-10

## 2022-01-30 ENCOUNTER — HEALTH MAINTENANCE LETTER (OUTPATIENT)
Age: 32
End: 2022-01-30

## 2022-02-17 PROBLEM — K21.9 GASTROESOPHAGEAL REFLUX DISEASE: Status: ACTIVE | Noted: 2020-03-13

## 2022-09-18 ENCOUNTER — HEALTH MAINTENANCE LETTER (OUTPATIENT)
Age: 32
End: 2022-09-18

## 2023-05-08 ENCOUNTER — HEALTH MAINTENANCE LETTER (OUTPATIENT)
Age: 33
End: 2023-05-08